# Patient Record
Sex: MALE | Race: OTHER | NOT HISPANIC OR LATINO
[De-identification: names, ages, dates, MRNs, and addresses within clinical notes are randomized per-mention and may not be internally consistent; named-entity substitution may affect disease eponyms.]

---

## 2021-08-10 ENCOUNTER — APPOINTMENT (OUTPATIENT)
Dept: OTOLARYNGOLOGY | Facility: CLINIC | Age: 31
End: 2021-08-10

## 2021-08-10 PROBLEM — Z00.00 ENCOUNTER FOR PREVENTIVE HEALTH EXAMINATION: Status: ACTIVE | Noted: 2021-08-10

## 2022-01-23 ENCOUNTER — EMERGENCY (EMERGENCY)
Facility: HOSPITAL | Age: 32
LOS: 1 days | Discharge: HOME | End: 2022-01-23
Attending: STUDENT IN AN ORGANIZED HEALTH CARE EDUCATION/TRAINING PROGRAM | Admitting: STUDENT IN AN ORGANIZED HEALTH CARE EDUCATION/TRAINING PROGRAM
Payer: COMMERCIAL

## 2022-01-23 ENCOUNTER — EMERGENCY (EMERGENCY)
Facility: HOSPITAL | Age: 32
LOS: 0 days | Discharge: ANOTHER HOSPITAL | End: 2022-01-23
Attending: EMERGENCY MEDICINE
Payer: COMMERCIAL

## 2022-01-23 VITALS
HEART RATE: 86 BPM | DIASTOLIC BLOOD PRESSURE: 74 MMHG | SYSTOLIC BLOOD PRESSURE: 133 MMHG | TEMPERATURE: 99 F | OXYGEN SATURATION: 100 % | RESPIRATION RATE: 16 BRPM

## 2022-01-23 VITALS — WEIGHT: 184.97 LBS | HEIGHT: 72 IN

## 2022-01-23 VITALS
DIASTOLIC BLOOD PRESSURE: 61 MMHG | OXYGEN SATURATION: 95 % | SYSTOLIC BLOOD PRESSURE: 104 MMHG | TEMPERATURE: 98 F | HEIGHT: 72 IN | RESPIRATION RATE: 17 BRPM | HEART RATE: 80 BPM

## 2022-01-23 DIAGNOSIS — R07.0 PAIN IN THROAT: ICD-10-CM

## 2022-01-23 DIAGNOSIS — J36 PERITONSILLAR ABSCESS: ICD-10-CM

## 2022-01-23 DIAGNOSIS — R06.00 DYSPNEA, UNSPECIFIED: ICD-10-CM

## 2022-01-23 DIAGNOSIS — R13.10 DYSPHAGIA, UNSPECIFIED: ICD-10-CM

## 2022-01-23 LAB
ALBUMIN SERPL ELPH-MCNC: 3.8 G/DL — SIGNIFICANT CHANGE UP (ref 3.3–5)
ALP SERPL-CCNC: 57 U/L — SIGNIFICANT CHANGE UP (ref 40–120)
ALT FLD-CCNC: 26 U/L — SIGNIFICANT CHANGE UP (ref 12–78)
ANION GAP SERPL CALC-SCNC: 3 MMOL/L — LOW (ref 5–17)
APTT BLD: 34.1 SEC — SIGNIFICANT CHANGE UP (ref 27.5–35.5)
AST SERPL-CCNC: 13 U/L — LOW (ref 15–37)
BASOPHILS # BLD AUTO: 0.04 K/UL — SIGNIFICANT CHANGE UP (ref 0–0.2)
BASOPHILS NFR BLD AUTO: 0.4 % — SIGNIFICANT CHANGE UP (ref 0–2)
BILIRUB SERPL-MCNC: 0.5 MG/DL — SIGNIFICANT CHANGE UP (ref 0.2–1.2)
BUN SERPL-MCNC: 13 MG/DL — SIGNIFICANT CHANGE UP (ref 7–23)
CALCIUM SERPL-MCNC: 9.4 MG/DL — SIGNIFICANT CHANGE UP (ref 8.5–10.1)
CHLORIDE SERPL-SCNC: 104 MMOL/L — SIGNIFICANT CHANGE UP (ref 96–108)
CO2 SERPL-SCNC: 30 MMOL/L — SIGNIFICANT CHANGE UP (ref 22–31)
CREAT SERPL-MCNC: 0.97 MG/DL — SIGNIFICANT CHANGE UP (ref 0.5–1.3)
EOSINOPHIL # BLD AUTO: 0.11 K/UL — SIGNIFICANT CHANGE UP (ref 0–0.5)
EOSINOPHIL NFR BLD AUTO: 1.1 % — SIGNIFICANT CHANGE UP (ref 0–6)
FLUAV AG NPH QL: SIGNIFICANT CHANGE UP
FLUBV AG NPH QL: SIGNIFICANT CHANGE UP
GLUCOSE SERPL-MCNC: 92 MG/DL — SIGNIFICANT CHANGE UP (ref 70–99)
HCT VFR BLD CALC: 40.6 % — SIGNIFICANT CHANGE UP (ref 39–50)
HGB BLD-MCNC: 13.3 G/DL — SIGNIFICANT CHANGE UP (ref 13–17)
IMM GRANULOCYTES NFR BLD AUTO: 0.3 % — SIGNIFICANT CHANGE UP (ref 0–1.5)
LACTATE SERPL-SCNC: 1.4 MMOL/L — SIGNIFICANT CHANGE UP (ref 0.7–2)
LYMPHOCYTES # BLD AUTO: 1.89 K/UL — SIGNIFICANT CHANGE UP (ref 1–3.3)
LYMPHOCYTES # BLD AUTO: 19 % — SIGNIFICANT CHANGE UP (ref 13–44)
MCHC RBC-ENTMCNC: 30.9 PG — SIGNIFICANT CHANGE UP (ref 27–34)
MCHC RBC-ENTMCNC: 32.8 GM/DL — SIGNIFICANT CHANGE UP (ref 32–36)
MCV RBC AUTO: 94.2 FL — SIGNIFICANT CHANGE UP (ref 80–100)
MONOCYTES # BLD AUTO: 0.87 K/UL — SIGNIFICANT CHANGE UP (ref 0–0.9)
MONOCYTES NFR BLD AUTO: 8.8 % — SIGNIFICANT CHANGE UP (ref 2–14)
NEUTROPHILS # BLD AUTO: 7 K/UL — SIGNIFICANT CHANGE UP (ref 1.8–7.4)
NEUTROPHILS NFR BLD AUTO: 70.4 % — SIGNIFICANT CHANGE UP (ref 43–77)
PLATELET # BLD AUTO: 239 K/UL — SIGNIFICANT CHANGE UP (ref 150–400)
POTASSIUM SERPL-MCNC: 3.8 MMOL/L — SIGNIFICANT CHANGE UP (ref 3.5–5.3)
POTASSIUM SERPL-SCNC: 3.8 MMOL/L — SIGNIFICANT CHANGE UP (ref 3.5–5.3)
PROT SERPL-MCNC: 7.7 GM/DL — SIGNIFICANT CHANGE UP (ref 6–8.3)
RBC # BLD: 4.31 M/UL — SIGNIFICANT CHANGE UP (ref 4.2–5.8)
RBC # FLD: 12.2 % — SIGNIFICANT CHANGE UP (ref 10.3–14.5)
RSV RNA NPH QL NAA+NON-PROBE: SIGNIFICANT CHANGE UP
SARS-COV-2 RNA SPEC QL NAA+PROBE: SIGNIFICANT CHANGE UP
SODIUM SERPL-SCNC: 137 MMOL/L — SIGNIFICANT CHANGE UP (ref 135–145)
WBC # BLD: 9.94 K/UL — SIGNIFICANT CHANGE UP (ref 3.8–10.5)
WBC # FLD AUTO: 9.94 K/UL — SIGNIFICANT CHANGE UP (ref 3.8–10.5)

## 2022-01-23 PROCEDURE — 85025 COMPLETE CBC W/AUTO DIFF WBC: CPT

## 2022-01-23 PROCEDURE — 36415 COLL VENOUS BLD VENIPUNCTURE: CPT

## 2022-01-23 PROCEDURE — 86850 RBC ANTIBODY SCREEN: CPT

## 2022-01-23 PROCEDURE — 99285 EMERGENCY DEPT VISIT HI MDM: CPT | Mod: 25

## 2022-01-23 PROCEDURE — 86900 BLOOD TYPING SEROLOGIC ABO: CPT

## 2022-01-23 PROCEDURE — 96375 TX/PRO/DX INJ NEW DRUG ADDON: CPT

## 2022-01-23 PROCEDURE — 80053 COMPREHEN METABOLIC PANEL: CPT

## 2022-01-23 PROCEDURE — 87040 BLOOD CULTURE FOR BACTERIA: CPT

## 2022-01-23 PROCEDURE — 86901 BLOOD TYPING SEROLOGIC RH(D): CPT

## 2022-01-23 PROCEDURE — 0241U: CPT

## 2022-01-23 PROCEDURE — 99284 EMERGENCY DEPT VISIT MOD MDM: CPT

## 2022-01-23 PROCEDURE — 83605 ASSAY OF LACTIC ACID: CPT

## 2022-01-23 PROCEDURE — 96365 THER/PROPH/DIAG IV INF INIT: CPT

## 2022-01-23 PROCEDURE — 85730 THROMBOPLASTIN TIME PARTIAL: CPT

## 2022-01-23 PROCEDURE — 99285 EMERGENCY DEPT VISIT HI MDM: CPT

## 2022-01-23 RX ORDER — LIDOCAINE HYDROCHLORIDE AND EPINEPHRINE 10; 10 MG/ML; UG/ML
10 INJECTION, SOLUTION INFILTRATION; PERINEURAL ONCE
Refills: 0 | Status: DISCONTINUED | OUTPATIENT
Start: 2022-01-23 | End: 2022-01-27

## 2022-01-23 RX ORDER — KETOROLAC TROMETHAMINE 30 MG/ML
15 SYRINGE (ML) INJECTION ONCE
Refills: 0 | Status: DISCONTINUED | OUTPATIENT
Start: 2022-01-23 | End: 2022-01-23

## 2022-01-23 RX ORDER — SODIUM CHLORIDE 9 MG/ML
1000 INJECTION INTRAMUSCULAR; INTRAVENOUS; SUBCUTANEOUS ONCE
Refills: 0 | Status: COMPLETED | OUTPATIENT
Start: 2022-01-23 | End: 2022-01-23

## 2022-01-23 RX ORDER — DEXAMETHASONE 0.5 MG/5ML
10 ELIXIR ORAL ONCE
Refills: 0 | Status: DISCONTINUED | OUTPATIENT
Start: 2022-01-23 | End: 2022-01-23

## 2022-01-23 RX ORDER — DEXAMETHASONE 0.5 MG/5ML
10 ELIXIR ORAL ONCE
Refills: 0 | Status: COMPLETED | OUTPATIENT
Start: 2022-01-23 | End: 2022-01-23

## 2022-01-23 RX ORDER — AMPICILLIN SODIUM AND SULBACTAM SODIUM 250; 125 MG/ML; MG/ML
3 INJECTION, POWDER, FOR SUSPENSION INTRAMUSCULAR; INTRAVENOUS ONCE
Refills: 0 | Status: COMPLETED | OUTPATIENT
Start: 2022-01-23 | End: 2022-01-23

## 2022-01-23 RX ADMIN — AMPICILLIN SODIUM AND SULBACTAM SODIUM 200 GRAM(S): 250; 125 INJECTION, POWDER, FOR SUSPENSION INTRAMUSCULAR; INTRAVENOUS at 11:15

## 2022-01-23 RX ADMIN — Medication 102 MILLIGRAM(S): at 11:54

## 2022-01-23 RX ADMIN — Medication 15 MILLIGRAM(S): at 11:35

## 2022-01-23 RX ADMIN — SODIUM CHLORIDE 2000 MILLILITER(S): 9 INJECTION INTRAMUSCULAR; INTRAVENOUS; SUBCUTANEOUS at 11:15

## 2022-01-23 RX ADMIN — AMPICILLIN SODIUM AND SULBACTAM SODIUM 3 GRAM(S): 250; 125 INJECTION, POWDER, FOR SUSPENSION INTRAMUSCULAR; INTRAVENOUS at 11:55

## 2022-01-23 NOTE — ED PROVIDER NOTE - PATIENT PORTAL LINK FT
You can access the FollowMyHealth Patient Portal offered by [facility] by registering at the following website: [email or web address]. By joining [employer]’s FollowMyHealth portal, you will also be able to view your health information using other applications (apps) compatible with our system.

## 2022-01-23 NOTE — ED PROVIDER NOTE - OBJECTIVE STATEMENT
this is a 31 y.o male with no PMhx presented to the ED complaining of swelling on the left side of his throat, he states that the pain has been persistent, he went to the [facility] where he was found to have a peritonsillar abscess and was transferred to [facility] ED. Patient received a dose of IV antibiotics, along with decadron and had lab work WNL. Patient has been able to swallow liquids and some solids. Patient denies having any fever, chills, bodyaches, headaches, dizziness, fever, or chills. Pt has never had similar symptoms to this previously. He went to the  a few days prior and was tested for covid which was negative. 31 y.o male with no PMhx presented to the ED complaining of swelling on the left side of his throat, he states that the pain has been persistent, he went to the [facility] where he was found to have a peritonsillar abscess and was transferred to [facility] ED. Patient received a dose of IV antibiotics, along with decadron and had lab work WNL. Patient has been able to swallow liquids and some solids. Patient denies having any fever, chills, bodyaches, headaches, dizziness, fever, or chills. Pt has never had similar symptoms to this previously. He went to the  a few days prior and was tested for covid which was negative.

## 2022-01-23 NOTE — CONSULT NOTE ADULT - SUBJECTIVE AND OBJECTIVE BOX
HPI: The patient is a 31 year old male with no significant past medical history who presents to the emergency room at [facility] with a chief complaint of left neck and throat pain and swelling.  He states that the pain has been persistent, he went to the [facility] where he was found to have a peritonsillar abscess and was transferred to [facility] ED. Patient received a dose of IV antibiotics, along with decadron and had lab work WNL. Patient has been able to swallow liquids and some solids. Patient denies having any fever, chills, bodyaches, headaches, dizziness, fever, or chills. Pt has never had similar symptoms to this previously. Patient also with facial/sinus headaches and post nasal drip.  He went to the  a few days prior and was tested for covid which was negative.    HIV:    HIV Test Questions:  · In accordance with [place] State law, we offer every patient who comes to our ED an HIV test. Would you like to be tested today?	Previously Declined (within the last year)    PAST MEDICAL/SURGICAL/FAMILY/SOCIAL HISTORY:    Tobacco Usage:  · Tobacco Usage	Never smoker    ALLERGIES AND HOME MEDICATIONS:   Allergies:        Allergies:  	Allergy Status Unknown:     Home Medications:   * Outpatient Medication Status not yet specified    LABS:  CBC Full  -  ( 23 Jan 2022 11:05 )  WBC Count : 9.94 K/uL  Hemoglobin : 13.3 g/dL  Hematocrit : 40.6 %  Platelet Count - Automated : 239 K/uL  Mean Cell Volume : 94.2 fl  Mean Cell Hemoglobin : 30.9 pg  Mean Cell Hemoglobin Concentration : 32.8 gm/dL  Auto Neutrophil # : 7.00 K/uL  Auto Lymphocyte # : 1.89 K/uL  Auto Monocyte # : 0.87 K/uL  Auto Eosinophil # : 0.11 K/uL  Auto Basophil # : 0.04 K/uL  Auto Neutrophil % : 70.4 %  Auto Lymphocyte % : 19.0 %  Auto Monocyte % : 8.8 %  Auto Eosinophil % : 1.1 %  Auto Basophil % : 0.4 %

## 2022-01-23 NOTE — CHART NOTE - NSCHARTNOTEFT_GEN_A_CORE
[facility]  Head & Neck Surgery Procedure Note    Name:                  Red Arboleda	                Surgeon:   David Nesbitt MD	  				  Date of Procedure: 01/23/2022                           Assistant:  None    Record Number:	[id number]                            Anesthesia:  Topical Anesthesia     Preprocedure diagnosis:	Acute maxillary sinusitis, unspecified (J01.00);  Acute Pansinusitis (J01.40)    Postprocedure diagnosis:	Same    Procedure:	              Bilateral maxillary sinus endoscopy  (02719)    INDICATION:  The patient is a 31 year old male with no significant past medical history who presents to the emergency room at [facility] with a chief complaint of left neck and throat pain and swelling.  He states that the pain has been persistent, he went to the [facility] where he was found to have a peritonsillar abscess and was transferred to [facility] ED. Patient received a dose of IV antibiotics, along with decadron and had lab work WNL. Patient has been able to swallow liquids and some solids. Patient denies having any fever, chills, bodyaches, headaches, dizziness, fever, or chills. Pt has never had similar symptoms to this previously. Patient also with facial/sinus headaches and post nasal drip.  He went to the  a few days prior and was tested for covid which was negative.    PROCEDURE:  The patient was seen and his nasal cavities were prepped and sprayed with topical neosynephrine anesthesia.   Following this, a zero degree flexible endoscope was passed into the left nasal vault, and passed posteriorly to the nasopharynx.  There was no evidence of any blood emanating from the left posterior superior lateral nasal wall. The scope was then slowly withdrawn and then rotated superiorly to visualize the middle turbinate and the inferior meatus and osteomeatal complex. The OMC on the left side appeared patent with no evidence of pus or obstruction.  The Maxillary sinus on the left side was then accessed through the inferior meatus.  The maxillary sinus had mild to moderate amount of mucoserous fluid within it without any evidence of masses or lesions.  The frontal duct was then inspected and appeared clear without any mucoid material flowing from it.  The Septum appeared straight.  The scope was then slowly withdrawn.  The zero degree endoscope was then introduced into the right nasal cavity and passed posteriorly to the nasopharynx. There were no masses visible.  There was no evidence of any bleeding emanating from the right posterior septum.  The endoscope was then rotated superiorly to visualize the middle turbinate and the inferior meatus and osteomeatal complex. The Maxillary sinus on the right side was then accessed via the inferior meatus.  There was a minimal amount of mucoserous fluid within the maxillary sinus with no evidence of any masses or pus.  Again the septum appeared to be straight.  The scope was then withdrawn.  The patient tolerated the procedure well.

## 2022-01-23 NOTE — ED PROVIDER NOTE - CLINICAL SUMMARY MEDICAL DECISION MAKING FREE TEXT BOX
this is a 20 y.o female with no PMhx presented to the ED complaining of having pain when swallowing over the past 2 weeks which has been persistently getting worse. Peritonsillar abscess he had lab work WNL at [facility] ENT reassess

## 2022-01-23 NOTE — ED ADULT TRIAGE NOTE - CHIEF COMPLAINT QUOTE
Patient presents in ED from [facility] for sore throat and difficulty swallowing. Patient sent by MD to r/o Pertonsillar abscess. Rapid Covid completed in office as well.

## 2022-01-23 NOTE — ED PROVIDER NOTE - ATTENDING CONTRIBUTION TO CARE
I have personally performed a history and physical examination of the patient and discussed management with the ACP as well as the patient.  I reviewed the ACP's note and agree with the documented findings and plan of care.  I have authored and modified critical sections of the Provider Note, including but not limited to HPI, Physical Exam and MDM.    30 yo M no PMhx presented to the ED complaining of swelling on the left side of his throat, he states that the pain has been persistent, he went to the [facility] where he was found to have a peritonsillar abscess and was transferred to [facility] ED for ENT evaluation. Patient received a dose of IV antibiotics, along with decadron and had lab work WNL. Patient has been able to swallow liquids and some solids. Airway patent. No CT prior to arrival despite prior documentation. Will discuss case with ENT.

## 2022-01-23 NOTE — CONSULT NOTE ADULT - ASSESSMENT
Assessment:  The patient is a 31 year old male with no significant past medical history who presents to the emergency room at [facility] with a chief complaint of left neck and throat pain and swelling. Ddx: left peritonsillar abscess and sinusitis.    Plan:  1) incision and drainage of left peritonsillar abscess at bedside  2) clindamycin  3) decadron

## 2022-01-23 NOTE — ED ADULT NURSE REASSESSMENT NOTE - NS ED NURSE REASSESS COMMENT FT1
Pt will be transferred to [facility]. Report given to Jenny SIMPSON. Pt a waiting transport. Pt stable at this time.

## 2022-01-23 NOTE — ED STATDOCS - OBJECTIVE STATEMENT
31 M no PMH here c/o worsening sore throat with L side worse than R with associated symptom of difficulty swallowing x 1 week. pt was evaluated at  2 days ago and had negative rapid COVID test,  pt sent in to r/o peritonsillar abscess. no fevers. NKDA.

## 2022-01-23 NOTE — ED ADULT NURSE NOTE - OBJECTIVE STATEMENT
Pt presents to ED ambulatory with steady gait sent from [place] for peritonsillar abscess, to be evaluated by ENT. Denies difficulty breathing or swallowing, but endorses pain when swallowing or talking. No s/s respiratory distress. Denies any other medical complaints. Treatment in progress. TATIANA Alarcon

## 2022-01-23 NOTE — ED PROVIDER NOTE - GASTROINTESTINAL NEGATIVE STATEMENT, MLM
no abdominal pain, no bloating, no constipation, no diarrhea, no nausea and no vomiting. Additional Anesthesia Type: 2% lidocaine without epinephrine

## 2022-01-23 NOTE — ED ADULT NURSE NOTE - NSIMPLEMENTINTERV_GEN_ALL_ED
Implemented All Universal Safety Interventions:  [place] to call system. Call bell, personal items and telephone within reach. Instruct patient to call for assistance. Room bathroom lighting operational. Non-slip footwear when patient is off stretcher. Physically safe environment: no spills, clutter or unnecessary equipment. Stretcher in lowest position, wheels locked, appropriate side rails in place.

## 2022-01-23 NOTE — ED PROVIDER NOTE - NSFOLLOWUPINSTRUCTIONS_ED_ALL_ED_FT
Rest, drink plenty of fluids.  Advance activity as tolerated.  Continue all previously prescribed medications as directed.  Follow up with your primary care physician in 48-72 hours- bring copies of your results.  Return to the ER for worsening or persistent symptoms, and/or ANY NEW OR CONCERNING SYMPTOMS. If you have issues obtaining follow up, please call: [phone] to obtain a doctor or specialist who takes your insurance in your area.  You may call [phone] to make an appointment with the internal medicine clinic.

## 2022-01-23 NOTE — ED STATDOCS - ATTENDING CONTRIBUTION TO CARE
I, Neha Rojas MD, personally saw the patient with ACP.  I have personally performed a face to face diagnostic evaluation on this patient.  I have reviewed the ACP note and agree with the history, exam, and plan of care, except as noted.  I personally saw the patient and performed a substantive portion of the visit including all aspects of the medical decision making.

## 2022-01-23 NOTE — ED STATDOCS - PHYSICAL EXAMINATION
Constitutional: NAD AAOx3  Eyes: PERRLA EOMI  Ears: (+) TMs clear  Head: Normocephalic atraumatic  Mouth: MMM. (+) obvious L sided peritonsillar abscess, uvula is mildly deviated to the R side  Cardiac: regular rate   Resp: Lungs CTAB  GI: Abd s/nt/nd  Neuro: CN2-12 intact  Extremities: Intact distal pulses b/l, no calf tenderness, normal ROM b/l UE and LE   Skin: No rashes Constitutional: NAD AAOx3  Eyes: PERRLA EOMI  Ears: (+) TMs clear  Head: Normocephalic atraumatic  Mouth: MMM. (+)  L sided peritonsillar abscess, uvula is mildly deviated to the R side  Cardiac: regular rate   Resp: Lungs CTAB  GI: Abd s/nt/nd  Neuro: CN2-12 intact  Extremities: Intact distal pulses b/l, no calf tenderness, normal ROM b/l UE and LE   Skin: No rashes

## 2022-01-23 NOTE — CHART NOTE - NSCHARTNOTEFT_GEN_A_CORE
[facility]  Head & Neck Surgery Procedure Note    Name:                  Red Arboleda	                Surgeon:   David Nesbitt MD	  				  Date of Procedure: 01/23/2022                           Assistant:  None    Record Number:	[id number]                            Anesthesia:  Topical Anesthesia     Preoperative diagnosis:	Stridor (R06.1)  	  Postoperative diagnosis:	Stridor (R06.1)    Procedure:		Flexible Fiberoptic Laryngoscopy  (51481)    **Reason for Fiberoptic Flexible Laryngoscopy:  Patient unable to cooperate with indirect mirror laryngoscopy due to severe gag reflex**    INDICATION:  The patient is a 31 year old male with no significant past medical history who presents to the emergency room at [facility] with a chief complaint of left neck and throat pain and swelling.  He states that the pain has been persistent, he went to the [facility] where he was found to have a peritonsillar abscess and was transferred to [facility] ED. Patient received a dose of IV antibiotics, along with decadron and had lab work WNL. Patient has been able to swallow liquids and some solids. Patient denies having any fever, chills, bodyaches, headaches, dizziness, fever, or chills. Pt has never had similar symptoms to this previously. Patient also with facial/sinus headaches and post nasal drip.  He went to the  a few days prior and was tested for covid which was negative.    PROCEDURE: The patient was seen and his bilateral nasal cavities were prepped and sprayed with topical anesthesia of neosynephrine.   Following this, a fiberoptic flexible laryngoscope was passed into the left nasal cavity, and then slowly and meticulously moved posteriorly to the nasopharynx.   There was clear mucous within the nasal cavity.  Once at nasopharynx the skull base and lateral walls of the eustachian tubes were examined for lesions and masses.  There was no blood or masses within the nasopharynx. There was prominence of the nasopharyngeal soft tissue consistent with inflammatory reaction.  The fiberoptic endoscope was then carefully directed inferiorly and moved to the hypopharynx and glottis.  There was no fullness of the base of tongue.  There was 3+ prominence of the tonsils bilaterally (equally) and with exudate. There was no evidence of retropharyngeal erythema or edema.  There was diffuse erythema  of the pharyngeal wall and supraglottic structure consistent with pharyngitis.  The true vocal cords appeared to be mobile bilaterally.   The airway was patent. The patient tolerated the procedure well..

## 2022-01-23 NOTE — ED PROVIDER NOTE - THROAT FINDINGS
left sided tonsillar abscess noted./UVULA DEVIATED RIGHT/UVULAR DEVIATION/PERITONSILLAR ABSCESS/TONSILLAR SWELLING/NO DROOLING

## 2022-01-23 NOTE — ED STATDOCS - CLINICAL SUMMARY MEDICAL DECISION MAKING FREE TEXT BOX
Patient presents with a left sided peritonsillar abscess, he was given IVF, decadron, unasyn and we d/w transfer center for ENT evaluation.

## 2022-01-23 NOTE — CHART NOTE - NSCHARTNOTEFT_GEN_A_CORE
[facility]  Head & Neck Surgery Procedure Note    Name:                  Red Arboleda	                Surgeon:   David Nesbitt MD	  				  Date of Procedure: 01/23/2022                           Assistant:  None    Record Number:	[id number]                            Anesthesia:  Topical Anesthesia     Preoperative diagnosis:	Peritonsillar Abscess (J36)    Postoperative diagnosis:	Same    Procedure:	              Incision and drainage abscess; left peritonsillar abscess(69398)      INDICATION: The patient is a 31 year old male with no significant past medical history who presents to the emergency room at [facility] with a chief complaint of left neck and throat pain and swelling.  He states that the pain has been persistent, he went to the [facility] where he was found to have a peritonsillar abscess and was transferred to [facility] ED. Patient received a dose of IV antibiotics, along with decadron and had lab work WNL. Patient has been able to swallow liquids and some solids. Patient denies having any fever, chills, bodyaches, headaches, dizziness, fever, or chills. Pt has never had similar symptoms to this previously. Patient also with facial/sinus headaches and post nasal drip.  He went to the  a few days prior and was tested for covid which was negative.    PROCEDURE:  After informed consent was obtained from the patient, the patient was placed supine on the procedure bed.  Next approximately 8 cc of 1% lidocaine with 1/100K parts epinephrine was injected into the left intraoral peritonsillar space.  Using a tongue blade and yankower suction, the tongue was retracted and left tonsillar pillars were identified.  Attention was turned to the left peritonsillar abscess.  Using a number 15 blade, a vertical incision was made in the superior to inferior direction along the anterior edge of the left palatal glossus muscle. The incision was carried down through the mucosa and sucutaneous tissue. Using blunt dissecting hemostats, dissection was carried down, posteriorly, to the peritonsillar space. The dissection was then carried superiorly until the abscess pocket was encountered. Copious amount of pus were expressed from the pocket.  Adequate hemostasis was achieved at the end of the case.   The patient tolerated the procedure well with no complications.

## 2022-01-23 NOTE — ED STATDOCS - NS ED ROS FT
Constitutional: No fever or chills  Eyes: No visual changes  HEENT: (+) throat pain, difficulty swallowing   CV: No chest pain  Resp: No SOB no cough  GI: No abd pain, nausea or vomiting  : No dysuria  MSK: No musculoskeletal pain  Skin: No rash  Neuro: No headache

## 2022-01-23 NOTE — ED PROVIDER NOTE - CARE PROVIDER_API CALL
David Nesbitt)  Otolaryngology  [address] [place] [address], [place] [address]  Phone: [phone]  Fax: [phone]  Follow Up Time:

## 2022-01-23 NOTE — ED STATDOCS - PROGRESS NOTE DETAILS
signed Deepika Alegria PA-C Pt seen and evaluated initially in intake by Dr. Rojas.   31M with sore throat x 1 week, worsening and now left sided. pt with hot potato voice and obvious left peritonsillar abscess on exam and uvular deviation. Pt tolerating secretions and airway currently patent. No abx recently, denies PMH or allergies to medication. I spoke to ENT Dr Ceja at [facility] via transfer center and Gaby from transfer center spoke to ER Dr Cas Yang who accepts pt transfer for ENT consult as ENT is not available at  ER. Pt consents for transfer.

## 2022-01-23 NOTE — ED CLERICAL - NS ED CLERK NOTE PRE-ARRIVAL INFORMATION; ADDITIONAL PRE-ARRIVAL INFORMATION
Transferred for ENT eval. Sore throat X 5 days, difficulty swallowing. CT shows reva-tonsillar abscess.  Treated with Unasyn, Decadron, Toradol.  VSS, afebrile.

## 2022-01-23 NOTE — ED ADULT NURSE NOTE - OBJECTIVE STATEMENT
Dr. Luther Yepez, patient needs a new order for PT due to Vertigo. Referral has been pended. Pt c/o throat pain. pt seen and examined by MD see MD note

## 2022-01-23 NOTE — ED ADULT NURSE NOTE - CHIEF COMPLAINT QUOTE
Patient presents in ED from [facility] for sore throat and difficulty swallowing. Patient sent by MD to r/o Pertonsillar abscess. Rapid Covid completed in office as well.

## 2022-01-23 NOTE — CONSULT NOTE ADULT - COMMENTS
Vital Signs Last 24 Hrs  T(C): 36.8 (23 Jan 2022 14:54), Max: 37.2 (23 Jan 2022 13:59)  T(F): 98.3 (23 Jan 2022 14:54), Max: 98.9 (23 Jan 2022 13:59)  HR: 80 (23 Jan 2022 14:54) (80 - 86)  BP: 104/61 (23 Jan 2022 14:54) (104/61 - 149/81)  BP(mean): 87 (23 Jan 2022 13:59) (87 - 96)  RR: 17 (23 Jan 2022 14:54) (16 - 18)  SpO2: 95% (23 Jan 2022 14:54) (95% - 100%)

## 2022-01-28 LAB
CULTURE RESULTS: SIGNIFICANT CHANGE UP
CULTURE RESULTS: SIGNIFICANT CHANGE UP
SPECIMEN SOURCE: SIGNIFICANT CHANGE UP
SPECIMEN SOURCE: SIGNIFICANT CHANGE UP

## 2024-07-08 NOTE — ED ADULT NURSE NOTE - NS_NURSE_TRANSFERRED_ED_ALL_ED_DT
PT to ED with family. Father reports patient was about fall in front of mom, mom grabbed R arm to stop patient from falling at approx 1700, patient will not move R arm but will allow family to touch arm. PT acting age appropriate in triage.   PT congested in triage with small raised bumps on body. Father reports patient has had this for the last few days.    23-Jan-2022 14:01

## 2025-01-06 ENCOUNTER — APPOINTMENT (OUTPATIENT)
Dept: FAMILY MEDICINE | Facility: CLINIC | Age: 35
End: 2025-01-06